# Patient Record
Sex: FEMALE | Race: WHITE | NOT HISPANIC OR LATINO
[De-identification: names, ages, dates, MRNs, and addresses within clinical notes are randomized per-mention and may not be internally consistent; named-entity substitution may affect disease eponyms.]

---

## 2017-01-31 ENCOUNTER — OTHER (OUTPATIENT)
Age: 52
End: 2017-01-31

## 2017-02-24 ENCOUNTER — RX RENEWAL (OUTPATIENT)
Age: 52
End: 2017-02-24

## 2017-04-10 ENCOUNTER — APPOINTMENT (OUTPATIENT)
Dept: OBGYN | Facility: CLINIC | Age: 52
End: 2017-04-10

## 2017-04-10 VITALS
SYSTOLIC BLOOD PRESSURE: 105 MMHG | HEIGHT: 62 IN | DIASTOLIC BLOOD PRESSURE: 69 MMHG | BODY MASS INDEX: 23.19 KG/M2 | WEIGHT: 126 LBS

## 2017-05-26 ENCOUNTER — RX RENEWAL (OUTPATIENT)
Age: 52
End: 2017-05-26

## 2017-09-16 ENCOUNTER — RX RENEWAL (OUTPATIENT)
Age: 52
End: 2017-09-16

## 2017-11-13 DIAGNOSIS — N63.0 UNSPECIFIED LUMP IN UNSPECIFIED BREAST: ICD-10-CM

## 2017-11-20 PROBLEM — N63.0 BREAST LUMP OR MASS: Status: ACTIVE | Noted: 2017-11-20

## 2017-11-27 ENCOUNTER — APPOINTMENT (OUTPATIENT)
Dept: OBGYN | Facility: CLINIC | Age: 52
End: 2017-11-27
Payer: COMMERCIAL

## 2017-11-27 VITALS
BODY MASS INDEX: 22.45 KG/M2 | HEIGHT: 62 IN | SYSTOLIC BLOOD PRESSURE: 99 MMHG | DIASTOLIC BLOOD PRESSURE: 68 MMHG | WEIGHT: 122 LBS

## 2017-11-27 PROCEDURE — 99396 PREV VISIT EST AGE 40-64: CPT

## 2017-11-28 ENCOUNTER — MESSAGE (OUTPATIENT)
Age: 52
End: 2017-11-28

## 2017-11-28 LAB — ESTRADIOL SERPL-MCNC: <5 PG/ML

## 2017-11-29 ENCOUNTER — MESSAGE (OUTPATIENT)
Age: 52
End: 2017-11-29

## 2017-11-29 LAB — HPV HIGH+LOW RISK DNA PNL CVX: NOT DETECTED

## 2017-11-30 ENCOUNTER — MESSAGE (OUTPATIENT)
Age: 52
End: 2017-11-30

## 2017-11-30 ENCOUNTER — MOBILE ON CALL (OUTPATIENT)
Age: 52
End: 2017-11-30

## 2017-11-30 LAB — CYTOLOGY CVX/VAG DOC THIN PREP: NORMAL

## 2018-01-18 ENCOUNTER — ASOB RESULT (OUTPATIENT)
Age: 53
End: 2018-01-18

## 2018-01-18 ENCOUNTER — APPOINTMENT (OUTPATIENT)
Dept: OBGYN | Facility: CLINIC | Age: 53
End: 2018-01-18
Payer: COMMERCIAL

## 2018-01-18 PROCEDURE — 76830 TRANSVAGINAL US NON-OB: CPT

## 2018-04-27 ENCOUNTER — RX RENEWAL (OUTPATIENT)
Age: 53
End: 2018-04-27

## 2018-05-21 ENCOUNTER — APPOINTMENT (OUTPATIENT)
Dept: OBGYN | Facility: CLINIC | Age: 53
End: 2018-05-21
Payer: COMMERCIAL

## 2018-05-21 VITALS
SYSTOLIC BLOOD PRESSURE: 111 MMHG | BODY MASS INDEX: 22.45 KG/M2 | HEIGHT: 62 IN | DIASTOLIC BLOOD PRESSURE: 77 MMHG | WEIGHT: 122 LBS

## 2018-05-21 PROCEDURE — 99214 OFFICE O/P EST MOD 30 MIN: CPT

## 2018-08-23 ENCOUNTER — RX RENEWAL (OUTPATIENT)
Age: 53
End: 2018-08-23

## 2018-12-10 ENCOUNTER — APPOINTMENT (OUTPATIENT)
Dept: OBGYN | Facility: CLINIC | Age: 53
End: 2018-12-10
Payer: COMMERCIAL

## 2018-12-10 VITALS
HEIGHT: 62 IN | DIASTOLIC BLOOD PRESSURE: 84 MMHG | WEIGHT: 124 LBS | SYSTOLIC BLOOD PRESSURE: 115 MMHG | BODY MASS INDEX: 22.82 KG/M2

## 2018-12-10 PROCEDURE — 82270 OCCULT BLOOD FECES: CPT

## 2018-12-10 PROCEDURE — 99396 PREV VISIT EST AGE 40-64: CPT

## 2018-12-11 ENCOUNTER — MESSAGE (OUTPATIENT)
Age: 53
End: 2018-12-11

## 2018-12-11 LAB — ESTRADIOL SERPL-MCNC: <5 PG/ML

## 2018-12-12 ENCOUNTER — MESSAGE (OUTPATIENT)
Age: 53
End: 2018-12-12

## 2018-12-12 LAB — HPV HIGH+LOW RISK DNA PNL CVX: NOT DETECTED

## 2018-12-14 ENCOUNTER — MESSAGE (OUTPATIENT)
Age: 53
End: 2018-12-14

## 2018-12-14 LAB — CYTOLOGY CVX/VAG DOC THIN PREP: NORMAL

## 2019-06-03 ENCOUNTER — APPOINTMENT (OUTPATIENT)
Dept: OBGYN | Facility: CLINIC | Age: 54
End: 2019-06-03
Payer: COMMERCIAL

## 2019-06-03 VITALS
BODY MASS INDEX: 22.82 KG/M2 | HEIGHT: 62 IN | WEIGHT: 124 LBS | SYSTOLIC BLOOD PRESSURE: 114 MMHG | DIASTOLIC BLOOD PRESSURE: 75 MMHG

## 2019-06-03 DIAGNOSIS — M81.0 AGE-RELATED OSTEOPOROSIS W/OUT CURRENT PATHOLOGICAL FRACTURE: ICD-10-CM

## 2019-06-03 PROCEDURE — 99214 OFFICE O/P EST MOD 30 MIN: CPT

## 2019-06-03 NOTE — CHIEF COMPLAINT
[FreeTextEntry1] : 54yo presents for evaluation and management of vaginal dryness and dyspareunia.\par She has been using vaginal estrogen with good response.

## 2019-07-08 ENCOUNTER — APPOINTMENT (OUTPATIENT)
Dept: OBGYN | Facility: CLINIC | Age: 54
End: 2019-07-08
Payer: COMMERCIAL

## 2019-07-08 ENCOUNTER — ASOB RESULT (OUTPATIENT)
Age: 54
End: 2019-07-08

## 2019-07-08 PROCEDURE — 76830 TRANSVAGINAL US NON-OB: CPT

## 2019-07-09 ENCOUNTER — MESSAGE (OUTPATIENT)
Age: 54
End: 2019-07-09

## 2019-08-07 ENCOUNTER — RX RENEWAL (OUTPATIENT)
Age: 54
End: 2019-08-07

## 2019-10-15 ENCOUNTER — RX RENEWAL (OUTPATIENT)
Age: 54
End: 2019-10-15

## 2019-10-17 ENCOUNTER — OTHER (OUTPATIENT)
Age: 54
End: 2019-10-17

## 2019-11-15 ENCOUNTER — MOBILE ON CALL (OUTPATIENT)
Age: 54
End: 2019-11-15

## 2019-12-11 ENCOUNTER — RX RENEWAL (OUTPATIENT)
Age: 54
End: 2019-12-11

## 2019-12-16 ENCOUNTER — APPOINTMENT (OUTPATIENT)
Dept: OBGYN | Facility: CLINIC | Age: 54
End: 2019-12-16

## 2020-02-24 ENCOUNTER — RX RENEWAL (OUTPATIENT)
Age: 55
End: 2020-02-24

## 2020-03-26 ENCOUNTER — RX RENEWAL (OUTPATIENT)
Age: 55
End: 2020-03-26

## 2020-07-14 ENCOUNTER — APPOINTMENT (OUTPATIENT)
Dept: OBGYN | Facility: CLINIC | Age: 55
End: 2020-07-14
Payer: COMMERCIAL

## 2020-07-14 VITALS
SYSTOLIC BLOOD PRESSURE: 125 MMHG | BODY MASS INDEX: 23.19 KG/M2 | DIASTOLIC BLOOD PRESSURE: 79 MMHG | HEIGHT: 62 IN | WEIGHT: 126 LBS

## 2020-07-14 PROCEDURE — 82270 OCCULT BLOOD FECES: CPT

## 2020-07-14 PROCEDURE — 99396 PREV VISIT EST AGE 40-64: CPT

## 2020-07-14 NOTE — PHYSICAL EXAM
[Awake] : awake [Alert] : alert [Acute Distress] : no acute distress [Thyroid Nodule] : no thyroid nodule [Mass] : no breast mass [Goiter] : no goiter [Axillary LAD] : no axillary lymphadenopathy [Nipple Discharge] : no nipple discharge [Soft] : soft [Tender] : non tender [Oriented x3] : oriented to person, place, and time [Normal] : cervix [Atrophy] : atrophy [Dry Mucosa] : dry mucosa [No Bleeding] : there was no active vaginal bleeding [Uterine Adnexae] : were not tender and not enlarged [No Tenderness] : no rectal tenderness [Nl Sphincter Tone] : normal sphincter tone [Occult Blood] : occult blood test from digital rectal exam was negative [External Hemorrhoid] : an external hemorrhoid

## 2020-07-15 ENCOUNTER — MESSAGE (OUTPATIENT)
Age: 55
End: 2020-07-15

## 2020-07-15 LAB — ESTRADIOL SERPL-MCNC: <5 PG/ML

## 2020-07-17 ENCOUNTER — MESSAGE (OUTPATIENT)
Age: 55
End: 2020-07-17

## 2020-07-17 LAB — HPV HIGH+LOW RISK DNA PNL CVX: NOT DETECTED

## 2020-07-18 LAB — CYTOLOGY CVX/VAG DOC THIN PREP: ABNORMAL

## 2020-07-21 ENCOUNTER — APPOINTMENT (OUTPATIENT)
Dept: OBGYN | Facility: CLINIC | Age: 55
End: 2020-07-21
Payer: COMMERCIAL

## 2020-07-21 ENCOUNTER — ASOB RESULT (OUTPATIENT)
Age: 55
End: 2020-07-21

## 2020-07-21 PROCEDURE — 76830 TRANSVAGINAL US NON-OB: CPT

## 2020-10-30 ENCOUNTER — RX RENEWAL (OUTPATIENT)
Age: 55
End: 2020-10-30

## 2020-12-14 ENCOUNTER — NON-APPOINTMENT (OUTPATIENT)
Age: 55
End: 2020-12-14

## 2020-12-15 ENCOUNTER — NON-APPOINTMENT (OUTPATIENT)
Age: 55
End: 2020-12-15

## 2020-12-29 ENCOUNTER — NON-APPOINTMENT (OUTPATIENT)
Age: 55
End: 2020-12-29

## 2021-01-04 ENCOUNTER — APPOINTMENT (OUTPATIENT)
Dept: OBGYN | Facility: CLINIC | Age: 56
End: 2021-01-04
Payer: COMMERCIAL

## 2021-01-04 VITALS
SYSTOLIC BLOOD PRESSURE: 107 MMHG | HEIGHT: 62 IN | BODY MASS INDEX: 23.19 KG/M2 | WEIGHT: 126 LBS | DIASTOLIC BLOOD PRESSURE: 75 MMHG

## 2021-01-04 PROCEDURE — 99214 OFFICE O/P EST MOD 30 MIN: CPT

## 2021-01-04 PROCEDURE — 99072 ADDL SUPL MATRL&STAF TM PHE: CPT

## 2021-01-04 NOTE — HISTORY OF PRESENT ILLNESS
[FreeTextEntry1] : 56yo,  presents for evaliuation and management of genitourinary syndrome of menopause with dyspareunia\par Her gyn history is significant for:\par 1.Vulvovaginal atrophy\par 2.Endometriosis\par 3.Benign breast biopsy 2020\par LMP 2010

## 2021-01-04 NOTE — DISCUSSION/SUMMARY
[FreeTextEntry1] : 54yo with good redsponse to vaginal estrogen\par Plan:\par 1.Continue vagifem\par 2.Left mammo/sono follow up 6/2021

## 2021-04-09 ENCOUNTER — RX RENEWAL (OUTPATIENT)
Age: 56
End: 2021-04-09

## 2021-06-30 ENCOUNTER — APPOINTMENT (OUTPATIENT)
Dept: OBGYN | Facility: CLINIC | Age: 56
End: 2021-06-30
Payer: COMMERCIAL

## 2021-06-30 VITALS — HEIGHT: 62 IN | DIASTOLIC BLOOD PRESSURE: 75 MMHG | SYSTOLIC BLOOD PRESSURE: 105 MMHG

## 2021-06-30 DIAGNOSIS — Z12.39 ENCOUNTER FOR OTHER SCREENING FOR MALIGNANT NEOPLASM OF BREAST: ICD-10-CM

## 2021-06-30 PROCEDURE — 82270 OCCULT BLOOD FECES: CPT

## 2021-06-30 PROCEDURE — 99072 ADDL SUPL MATRL&STAF TM PHE: CPT

## 2021-06-30 PROCEDURE — 99396 PREV VISIT EST AGE 40-64: CPT

## 2021-06-30 NOTE — HISTORY OF PRESENT ILLNESS
[FreeTextEntry1] : 54yo,  with gyn history significant for:\par 1.Left benign brest biopsy\par 2.Vulvovaginal atrophy\par 3.Endometriosis\par LMP 2010

## 2021-06-30 NOTE — DISCUSSION/SUMMARY
[FreeTextEntry1] : 56yo with good control of genitourinary syndrome of menopause symptom complex\par Plan:\par 1.TVS\par 2.F/U left mammo/sono\par 3.RF vagifem 6 months\par 4.PAP\par 5.Colonsocopy per routine

## 2021-06-30 NOTE — PHYSICAL EXAM
[Examination Of The Breasts] : a normal appearance [No Masses] : no breast masses were palpable [Labia Majora] : normal [Labia Minora] : normal [Atrophy] : atrophy [Dry Mucosa] : dry mucosa [Normal] : normal [Uterine Adnexae] : normal [No Tenderness] : no tenderness [Nl Sphincter Tone] : normal sphincter tone [External Hemorrhoid] : external hemorrhoid [FreeTextEntry9] : stool HO neg

## 2021-07-01 ENCOUNTER — MESSAGE (OUTPATIENT)
Age: 56
End: 2021-07-01

## 2021-07-01 LAB — ESTRADIOL SERPL-MCNC: <5 PG/ML

## 2021-07-02 ENCOUNTER — MESSAGE (OUTPATIENT)
Age: 56
End: 2021-07-02

## 2021-07-02 LAB — HPV HIGH+LOW RISK DNA PNL CVX: NOT DETECTED

## 2021-07-06 ENCOUNTER — NON-APPOINTMENT (OUTPATIENT)
Age: 56
End: 2021-07-06

## 2021-07-07 ENCOUNTER — MESSAGE (OUTPATIENT)
Age: 56
End: 2021-07-07

## 2021-07-07 LAB — CYTOLOGY CVX/VAG DOC THIN PREP: NORMAL

## 2021-07-27 ENCOUNTER — NON-APPOINTMENT (OUTPATIENT)
Age: 56
End: 2021-07-27

## 2021-08-10 ENCOUNTER — ASOB RESULT (OUTPATIENT)
Age: 56
End: 2021-08-10

## 2021-08-10 ENCOUNTER — APPOINTMENT (OUTPATIENT)
Dept: OBGYN | Facility: CLINIC | Age: 56
End: 2021-08-10
Payer: COMMERCIAL

## 2021-08-10 PROCEDURE — 76830 TRANSVAGINAL US NON-OB: CPT

## 2021-08-11 ENCOUNTER — NON-APPOINTMENT (OUTPATIENT)
Age: 56
End: 2021-08-11

## 2021-12-13 PROBLEM — Z12.39 BREAST CANCER SCREENING OTHER THAN MAMMOGRAM: Status: ACTIVE | Noted: 2021-12-13

## 2022-01-04 ENCOUNTER — NON-APPOINTMENT (OUTPATIENT)
Age: 57
End: 2022-01-04

## 2022-01-04 ENCOUNTER — APPOINTMENT (OUTPATIENT)
Dept: OBGYN | Facility: CLINIC | Age: 57
End: 2022-01-04
Payer: COMMERCIAL

## 2022-01-04 ENCOUNTER — RX RENEWAL (OUTPATIENT)
Age: 57
End: 2022-01-04

## 2022-01-04 VITALS
BODY MASS INDEX: 23.19 KG/M2 | DIASTOLIC BLOOD PRESSURE: 74 MMHG | SYSTOLIC BLOOD PRESSURE: 110 MMHG | WEIGHT: 126 LBS | HEIGHT: 62 IN

## 2022-01-04 DIAGNOSIS — N94.10 UNSPECIFIED DYSPAREUNIA: ICD-10-CM

## 2022-01-04 DIAGNOSIS — N95.2 POSTMENOPAUSAL ATROPHIC VAGINITIS: ICD-10-CM

## 2022-01-04 PROCEDURE — 99214 OFFICE O/P EST MOD 30 MIN: CPT

## 2022-01-04 NOTE — HISTORY OF PRESENT ILLNESS
[FreeTextEntry1] : 55yo,   for evaluation and management of vulvovaginal atrophy and dyspareunia\par Her symptoms have responded well to vaginal estrogen.Her gyn history is significant for:\par 1.Benign breast biopsy\par 2.Endometriosis\par LMP 2010

## 2022-01-04 NOTE — DISCUSSION/SUMMARY
[FreeTextEntry1] : 57yo with  good clinical results on Vagifem, risks reviewed\par \par Plan:\par 1.Continue

## 2022-01-04 NOTE — PHYSICAL EXAM
[Chaperone Declined] : Patient declined chaperone [Examination Of The Breasts] : a normal appearance [No Masses] : no breast masses were palpable [Labia Majora] : normal [Labia Minora] : normal [Normal] : normal [Uterine Adnexae] : normal

## 2022-07-05 ENCOUNTER — APPOINTMENT (OUTPATIENT)
Dept: OBGYN | Facility: CLINIC | Age: 57
End: 2022-07-05

## 2022-07-21 ENCOUNTER — APPOINTMENT (OUTPATIENT)
Dept: OBGYN | Facility: CLINIC | Age: 57
End: 2022-07-21

## 2022-07-21 VITALS
SYSTOLIC BLOOD PRESSURE: 100 MMHG | HEART RATE: 90 BPM | WEIGHT: 127 LBS | DIASTOLIC BLOOD PRESSURE: 66 MMHG | BODY MASS INDEX: 23.37 KG/M2 | HEIGHT: 62 IN

## 2022-07-21 DIAGNOSIS — Z87.59 PERSONAL HISTORY OF OTHER COMPLICATIONS OF PREGNANCY, CHILDBIRTH AND THE PUERPERIUM: ICD-10-CM

## 2022-07-21 DIAGNOSIS — Z87.42 PERSONAL HISTORY OF OTHER DISEASES OF THE FEMALE GENITAL TRACT: ICD-10-CM

## 2022-07-21 DIAGNOSIS — Z12.11 ENCOUNTER FOR SCREENING FOR MALIGNANT NEOPLASM OF COLON: ICD-10-CM

## 2022-07-21 DIAGNOSIS — Z78.9 OTHER SPECIFIED HEALTH STATUS: ICD-10-CM

## 2022-07-21 DIAGNOSIS — Z12.12 ENCOUNTER FOR SCREENING FOR MALIGNANT NEOPLASM OF COLON: ICD-10-CM

## 2022-07-21 PROCEDURE — 99396 PREV VISIT EST AGE 40-64: CPT

## 2022-07-21 RX ORDER — ESTRADIOL 10 UG/1
10 TABLET VAGINAL
Qty: 24 | Refills: 2 | Status: COMPLETED | COMMUNITY
Start: 2021-01-04 | End: 2022-07-21

## 2022-07-21 RX ORDER — ESTRADIOL 10 UG/1
10 TABLET, FILM COATED VAGINAL
Qty: 24 | Refills: 1 | Status: COMPLETED | COMMUNITY
Start: 2018-12-10 | End: 2022-07-21

## 2022-07-21 RX ORDER — ESTRADIOL 10 UG/1
10 TABLET VAGINAL
Qty: 24 | Refills: 0 | Status: COMPLETED | COMMUNITY
Start: 2017-02-24 | End: 2022-07-21

## 2022-07-21 RX ORDER — ESTRADIOL 10 UG/1
10 TABLET VAGINAL
Qty: 8 | Refills: 0 | Status: COMPLETED | COMMUNITY
Start: 2017-11-27 | End: 2022-07-21

## 2022-07-21 RX ORDER — ESTRADIOL 10 UG/1
10 INSERT VAGINAL
Qty: 24 | Refills: 0 | Status: COMPLETED | COMMUNITY
Start: 2017-04-10 | End: 2022-07-21

## 2022-07-21 RX ORDER — ESTRADIOL 10 UG/1
10 TABLET, FILM COATED VAGINAL
Qty: 24 | Refills: 2 | Status: COMPLETED | COMMUNITY
Start: 2021-06-30 | End: 2022-07-21

## 2022-07-21 RX ORDER — ESTRADIOL 10 UG/1
10 TABLET VAGINAL
Qty: 24 | Refills: 0 | Status: COMPLETED | COMMUNITY
Start: 2019-11-15 | End: 2022-07-21

## 2022-07-21 RX ORDER — MELOXICAM 15 MG/1
15 TABLET ORAL
Qty: 30 | Refills: 0 | Status: ACTIVE | COMMUNITY
Start: 2022-02-25

## 2022-07-21 RX ORDER — CYCLOSPORINE 0.5 MG/ML
0.05 EMULSION OPHTHALMIC
Qty: 60 | Refills: 0 | Status: COMPLETED | COMMUNITY
Start: 2022-03-14

## 2022-07-21 RX ORDER — ESTRADIOL 10 UG/1
10 TABLET, FILM COATED VAGINAL
Qty: 8 | Refills: 0 | Status: COMPLETED | COMMUNITY
Start: 2022-01-04 | End: 2022-07-21

## 2022-07-21 RX ORDER — ESTRADIOL 10 UG/1
10 TABLET, FILM COATED VAGINAL
Qty: 3 | Refills: 0 | Status: COMPLETED | COMMUNITY
Start: 2020-07-14 | End: 2022-07-21

## 2022-07-21 RX ORDER — ESTRADIOL 10 UG/1
10 TABLET, FILM COATED VAGINAL
Qty: 24 | Refills: 0 | Status: COMPLETED | COMMUNITY
Start: 2018-05-21 | End: 2022-07-21

## 2022-07-21 RX ORDER — LIFITEGRAST 50 MG/ML
5 SOLUTION/ DROPS OPHTHALMIC
Qty: 60 | Refills: 0 | Status: ACTIVE | COMMUNITY
Start: 2022-07-06

## 2022-07-21 NOTE — HISTORY OF PRESENT ILLNESS
[TextBox_4] : 55yo presents for annual exam \par pt states she was being seen twice a year for vagifem - was checking estrogen levels \par no complaints  [Mammogramdate] : 2021 [PapSmeardate] : 2021 [BoneDensityDate] : 2019 [ColonoscopyDate] : 5 years ago

## 2022-07-28 LAB
CYTOLOGY CVX/VAG DOC THIN PREP: ABNORMAL
HPV HIGH+LOW RISK DNA PNL CVX: DETECTED

## 2022-08-03 ENCOUNTER — NON-APPOINTMENT (OUTPATIENT)
Age: 57
End: 2022-08-03

## 2022-08-04 ENCOUNTER — APPOINTMENT (OUTPATIENT)
Dept: OBGYN | Facility: CLINIC | Age: 57
End: 2022-08-04

## 2022-08-04 ENCOUNTER — TRANSCRIPTION ENCOUNTER (OUTPATIENT)
Age: 57
End: 2022-08-04

## 2022-08-04 DIAGNOSIS — R87.610 ATYPICAL SQUAMOUS CELLS OF UNDETERMINED SIGNIFICANCE ON CYTOLOGIC SMEAR OF CERVIX (ASC-US): ICD-10-CM

## 2022-08-04 DIAGNOSIS — R87.810 ATYPICAL SQUAMOUS CELLS OF UNDETERMINED SIGNIFICANCE ON CYTOLOGIC SMEAR OF CERVIX (ASC-US): ICD-10-CM

## 2022-08-04 PROCEDURE — 57454 BX/CURETT OF CERVIX W/SCOPE: CPT

## 2022-08-04 NOTE — PROCEDURE
[Colposcopy] : Colposcopy  [Risks] : risks [Benefits] : benefits [Alternatives] : alternatives [Abnormal Pap] : abnormal pap [HPV High Risk] : HPV high risk [Colposcopy Adequate] : colposcopy adequate [SCI Fully Visualized] : SCI fully visualized [ECC Performed] : ECC performed [Hemostasis Obtained] : Hemostasis obtained [Tolerated Well] : the patient tolerated the procedure well [de-identified] : a [de-identified] : acetowhite 4, 12 [de-identified] : 4, 12

## 2022-08-11 ENCOUNTER — NON-APPOINTMENT (OUTPATIENT)
Age: 57
End: 2022-08-11

## 2022-08-11 LAB — CORE LAB BIOPSY: NORMAL

## 2022-08-15 ENCOUNTER — NON-APPOINTMENT (OUTPATIENT)
Age: 57
End: 2022-08-15

## 2022-08-17 ENCOUNTER — NON-APPOINTMENT (OUTPATIENT)
Age: 57
End: 2022-08-17

## 2022-08-25 ENCOUNTER — NON-APPOINTMENT (OUTPATIENT)
Age: 57
End: 2022-08-25

## 2022-08-30 ENCOUNTER — NON-APPOINTMENT (OUTPATIENT)
Age: 57
End: 2022-08-30

## 2022-09-06 ENCOUNTER — NON-APPOINTMENT (OUTPATIENT)
Age: 57
End: 2022-09-06

## 2022-09-08 ENCOUNTER — NON-APPOINTMENT (OUTPATIENT)
Age: 57
End: 2022-09-08

## 2022-11-22 ENCOUNTER — EMERGENCY (EMERGENCY)
Facility: HOSPITAL | Age: 57
LOS: 1 days | Discharge: HOME | End: 2022-11-22
Attending: EMERGENCY MEDICINE | Admitting: EMERGENCY MEDICINE
Payer: COMMERCIAL

## 2022-11-22 VITALS
TEMPERATURE: 97 F | HEART RATE: 98 BPM | DIASTOLIC BLOOD PRESSURE: 86 MMHG | OXYGEN SATURATION: 99 % | HEIGHT: 62 IN | SYSTOLIC BLOOD PRESSURE: 118 MMHG | RESPIRATION RATE: 18 BRPM | WEIGHT: 130.07 LBS

## 2022-11-22 VITALS
RESPIRATION RATE: 18 BRPM | DIASTOLIC BLOOD PRESSURE: 79 MMHG | HEART RATE: 90 BPM | SYSTOLIC BLOOD PRESSURE: 115 MMHG | OXYGEN SATURATION: 99 %

## 2022-11-22 PROCEDURE — 99284 EMERGENCY DEPT VISIT MOD MDM: CPT

## 2022-11-22 PROCEDURE — 70450 CT HEAD/BRAIN W/O DYE: CPT | Mod: MA

## 2022-11-22 PROCEDURE — 70450 CT HEAD/BRAIN W/O DYE: CPT | Mod: 26,MA

## 2022-11-22 PROCEDURE — 99284 EMERGENCY DEPT VISIT MOD MDM: CPT | Mod: 25

## 2022-11-22 RX ORDER — ACETAMINOPHEN 500 MG
975 TABLET ORAL ONCE
Refills: 0 | Status: COMPLETED | OUTPATIENT
Start: 2022-11-22 | End: 2022-11-22

## 2022-11-22 RX ADMIN — Medication 975 MILLIGRAM(S): at 12:35

## 2022-11-22 RX ADMIN — Medication 975 MILLIGRAM(S): at 12:05

## 2022-11-22 NOTE — ED PROVIDER NOTE - NSFOLLOWUPINSTRUCTIONS_ED_ALL_ED_FT
(0) understands/communicates without difficulty
Shingles    A rash on the skin.   Shingles, which is also known as herpes zoster, is an infection that causes a painful skin rash and fluid-filled blisters. It is caused by a virus.    Shingles only develops in people who:  •Have had chickenpox.      •Have been vaccinated against chickenpox. Shingles is rare in this group.        What are the causes?    Shingles is caused by varicella-zoster virus. This is the same virus that causes chickenpox. After a person is exposed to the virus, it stays in the body in an inactive (dormant) state. Shingles develops if the virus is reactivated. This can happen many years after the first (initial) exposure to the virus. It is not known what causes this virus to be reactivated.      What increases the risk?    People who have had chickenpox or received the chickenpox vaccine are at risk for shingles. Shingles infection is more common in people who:  •Are older than 60 years of age.    •Have a weakened disease-fighting system (immune system), such as people with:  •HIV (human immunodeficiency virus).      •AIDS (acquired immunodeficiency syndrome).      •Cancer.        •Are taking medicines that weaken the immune system, such as organ transplant medicines.      •Are experiencing a lot of stress.        What are the signs or symptoms?    Early symptoms of this condition include itching, tingling, and pain in an area on your skin. Pain may be described as burning, stabbing, or throbbing.    A few days or weeks after early symptoms start, a painful red rash appears. The rash is usually on one side of the body and has a band-like or belt-like pattern. The rash eventually turns into fluid-filled blisters that break open, change into scabs, and dry up in about 2–3 weeks.    At any time during the infection, you may also develop:  •A fever.      •Chills.      •A headache.      •Nausea.        How is this diagnosed?    This condition is diagnosed with a skin exam. Skin or fluid samples (a culture) may be taken from the blisters before a diagnosis is made.      How is this treated?    The rash may last for several weeks. There is not a specific cure for this condition. Your health care provider may prescribe medicines to help you manage pain, recover more quickly, and avoid long-term problems. Medicines may include:  •Antiviral medicines.      •Anti-inflammatory medicines.      •Pain medicines.      •Anti-itching medicines (antihistamines).      If the area involved is on your face, you may be referred to a specialist, such as an eye doctor (ophthalmologist) or an ear, nose, and throat (ENT) doctor (otorhinolaryngologist) to help you avoid eye problems, chronic pain, or disability.      Follow these instructions at home:    Medicines     •Take over-the-counter and prescription medicines only as told by your health care provider.      •Apply an anti-itch cream or numbing cream to the affected area as told by your health care provider.        Relieving itching and discomfort   A bathtub filled with water.   •Apply cold, wet cloths (cold compresses) to the area of the rash or blisters as told by your health care provider.      •Cool baths can be soothing. Try adding baking soda or dry oatmeal to the water to reduce itching. Do not bathe in hot water.      •Use calamine lotion as recommended by your health care provider. This is an over-the-counter lotion that helps to relieve itchiness.      Blister and rash care     •Keep your rash covered with a loose bandage (dressing). Wear loose-fitting clothing to help ease the pain of material rubbing against the rash.      •Wash your hands with soap and water for at least 20 seconds before and after you change your dressing. If soap and water are not available, use hand .      •Change your dressing as told by your health care provider.      •Keep your rash and blisters clean by washing the area with mild soap and cool water as told by your health care provider.    •Check your rash every day for signs of infection. Check for:  •More redness, swelling, or pain.      •Fluid or blood.      •Warmth.      •Pus or a bad smell.      • Do not scratch your rash or pick at your blisters. To help avoid scratching:  •Keep your fingernails clean and cut short.      •Wear gloves or mittens while you sleep, if scratching is a problem.        General instructions     •Rest as told by your health care provider.      •Wash your hands often with soap and water for at least 20 seconds. If soap and water are not available, use hand . Doing this lowers your chance of getting a bacterial skin infection.    •Before your blisters change into scabs, your shingles infection can cause chickenpox in people who have never had it or have never been vaccinated against it. To prevent this from happening, avoid contact with other people, especially:  •Babies.      •Pregnant women.      •Children who have eczema.      •Older people who have transplants.      •People who have chronic illnesses, such as cancer or AIDS.        •Keep all follow-up visits. This is important.        How is this prevented?    Getting vaccinated is the best way to prevent shingles and protect against shingles complications. If you have not been vaccinated, talk with your health care provider about getting the vaccine.      Where to find more information    •Centers for Disease Control and Prevention: www.cdc.gov        Contact a health care provider if:    •Your pain is not relieved with prescribed medicines.      •Your pain does not get better after the rash heals.    •You have any of these signs of infection:  •More redness, swelling, or pain around the rash.      •Fluid or blood coming from the rash.      •Warmth coming from your rash.      •Pus or a bad smell coming from the rash.      •A fever.          Get help right away if:    •The rash is on your face or nose.      •You have facial pain, pain around your eye area, or loss of feeling on one side of your face.      •You have difficulty seeing.      •You have ear pain or have ringing in your ear.      •You have a loss of taste.      •Your condition gets worse.        Summary    •Shingles, also known as herpes zoster, is an infection that causes a painful skin rash and fluid-filled blisters.      •This condition is diagnosed with a skin exam. Skin or fluid samples (a culture) may be taken from the blisters.      •Keep your rash covered with a loose bandage (dressing). Wear loose-fitting clothing to help ease the pain of material rubbing against the rash.      •Before your blisters change into scabs, your shingles infection can cause chickenpox in people who have never had it or have never been vaccinated against it.      This information is not intended to replace advice given to you by your health care provider. Make sure you discuss any questions you have with your health care provider.    **Follow up with your doctor. Take ibuprofen for pain. Follow up with neurologist if pain persists beyond 2 weeks. Return for worsening symptoms, any concerns.

## 2022-11-22 NOTE — ED PROVIDER NOTE - PATIENT PORTAL LINK FT
You can access the FollowMyHealth Patient Portal offered by [facility] by registering at the following website: [email or web address]. By joining "[employer]"’s FollowMyHealth portal, you will also be able to view your health information using other applications (apps) compatible with our system.

## 2022-11-22 NOTE — ED PROVIDER NOTE - SKIN NEGATIVE STATEMENT, MLM
no abrasions, no jaundice, no lesions, no pruritis, and no rashes. left scalp pain radiating down to left ear/jaw

## 2022-11-22 NOTE — ED PROVIDER NOTE - OBJECTIVE STATEMENT
Patient came into the ED with family c/o left scalp pain radiating down to left ear/jaw x 1 week. pain is relieved with advil, but returns. patient has had a headache since last night. no N/V. no dizziness. no rash. never occurred previously. no numbing/tingling. banged her head lightly 5 days before symptoms started. patient took advil this morning with some relief.

## 2022-11-22 NOTE — ED ADULT NURSE NOTE - OBJECTIVE STATEMENT
patient comes to ED for evaluation of scalp pain pain left ear and into jaw which she developed approx one week ago five days after banging her head patient report  pain worsens when she touches her scalp or moves her hair patient is taking Advil with partial relief patient reports she thought she may have shingles but hasn't developed any rash

## 2022-11-22 NOTE — ED PROVIDER NOTE - PROGRESS NOTE DETAILS
CT reviewed with patient. to f/u PCP. if pain persists beyond 2 weeks to f/u neurologist. discussed again probable shingles.

## 2022-11-22 NOTE — ED ADULT NURSE REASSESSMENT NOTE - NS ED NURSE REASSESS COMMENT FT1
1130: Received pt from previous nurse, Segundo MIRZA in room 6A, a/o x4 , no respiratory distress noted even and unlabored breathing, skin warm and dry, all pulses are palpable, + sensation. Abd soft BS + all 4 quads nontender. Pt c/o scalp pain, denies injury.

## 2022-11-22 NOTE — ED ADULT TRIAGE NOTE - CHIEF COMPLAINT QUOTE
Pt ambulatory to triage c/o pain to the L side of her scalp now radiating down her ear/neck x 1 week.    Pt denies dizziness/weakness/nausea/changes in balance.  Initially denies headache though states "I have a headache today because its been bothering me all night".   No acute neurological deficits.    Denies any rash/swelling fevers.   Pt did hit her head 2 weeks ago though pain was minimal and improved after several days, pt does not take blood thinners. BN

## 2023-02-10 ENCOUNTER — APPOINTMENT (OUTPATIENT)
Dept: INTERNAL MEDICINE | Facility: CLINIC | Age: 58
End: 2023-02-10

## 2023-04-25 PROBLEM — E03.9 HYPOTHYROIDISM, UNSPECIFIED: Chronic | Status: ACTIVE | Noted: 2022-11-22

## 2023-07-24 ENCOUNTER — APPOINTMENT (OUTPATIENT)
Dept: OBGYN | Facility: CLINIC | Age: 58
End: 2023-07-24

## 2023-08-01 ENCOUNTER — LABORATORY RESULT (OUTPATIENT)
Age: 58
End: 2023-08-01

## 2023-08-01 ENCOUNTER — APPOINTMENT (OUTPATIENT)
Dept: OBGYN | Facility: CLINIC | Age: 58
End: 2023-08-01
Payer: COMMERCIAL

## 2023-08-01 VITALS
DIASTOLIC BLOOD PRESSURE: 75 MMHG | SYSTOLIC BLOOD PRESSURE: 115 MMHG | WEIGHT: 132 LBS | HEART RATE: 90 BPM | BODY MASS INDEX: 24.29 KG/M2 | HEIGHT: 62 IN

## 2023-08-01 DIAGNOSIS — Z01.419 ENCOUNTER FOR GYNECOLOGICAL EXAMINATION (GENERAL) (ROUTINE) W/OUT ABNORMAL FINDINGS: ICD-10-CM

## 2023-08-01 PROCEDURE — 99396 PREV VISIT EST AGE 40-64: CPT

## 2023-08-01 NOTE — DISCUSSION/SUMMARY
[FreeTextEntry1] : thin prep and HPV ordered referral given for tvus (pt request bc on vagifem), mammogram and breast sonogram rto in 1 year or soon for any gyn issues. pt verbalized understanding.

## 2023-08-01 NOTE — HISTORY OF PRESENT ILLNESS
[Patient reported mammogram was normal] : Patient reported mammogram was normal [Patient reported breast sonogram was normal] : Patient reported breast sonogram was normal [Patient reported PAP Smear was normal] : Patient reported PAP Smear was normal [Patient reported bone density results were normal] : Patient reported bone density results were normal [Post-Menopause, No Sxs] : post-menopausal, currently without symptoms [Patient refuses STI testing] : Patient refuses STI testing [FreeTextEntry1] : 56 y/o  female, LMP: 10 yrs ago for annual GYN visit.  Patient denies any GYN complaints.   Sexual Activity: monogamous with  Social/Mental Health: reports social ETOH, denies tobacco or any illicit drug use.  Denies depression, anxiety, thoughts of personal harm or suicidal ideation.  ROS:  Denies fever/chills, HA, Cough/sore throat, CP, SOB, N/V, Diarrhea/Constipation, Pelvic pain, Urinary frequency/urgency/incontinence, irregular vaginal bleeding, discharge or irritation.    Medical History GYN HX: abnl pap, HPV. PMH: hypothyroidism PSH: D&C, laparoscopy dx endometriosis Meds: levothyroxine, vagifem Allergies: NKDA fam hx: no updates [Mammogramdate] : 3/2023 [BreastSonogramDate] : 3/2023 [PapSmeardate] : 7/2021 [BoneDensityDate] : 2019 [ColonoscopyDate] : utd

## 2023-08-04 ENCOUNTER — NON-APPOINTMENT (OUTPATIENT)
Age: 58
End: 2023-08-04

## 2023-08-04 LAB
CYTOLOGY CVX/VAG DOC THIN PREP: NORMAL
HPV HIGH+LOW RISK DNA PNL CVX: DETECTED

## 2023-08-08 ENCOUNTER — ASOB RESULT (OUTPATIENT)
Age: 58
End: 2023-08-08

## 2023-08-08 ENCOUNTER — APPOINTMENT (OUTPATIENT)
Dept: OBGYN | Facility: CLINIC | Age: 58
End: 2023-08-08
Payer: COMMERCIAL

## 2023-08-08 ENCOUNTER — TRANSCRIPTION ENCOUNTER (OUTPATIENT)
Age: 58
End: 2023-08-08

## 2023-08-08 PROCEDURE — 76830 TRANSVAGINAL US NON-OB: CPT

## 2023-09-07 ENCOUNTER — TRANSCRIPTION ENCOUNTER (OUTPATIENT)
Age: 58
End: 2023-09-07

## 2023-09-08 ENCOUNTER — TRANSCRIPTION ENCOUNTER (OUTPATIENT)
Age: 58
End: 2023-09-08

## 2023-09-12 ENCOUNTER — TRANSCRIPTION ENCOUNTER (OUTPATIENT)
Age: 58
End: 2023-09-12

## 2024-08-13 ENCOUNTER — APPOINTMENT (OUTPATIENT)
Dept: OBGYN | Facility: CLINIC | Age: 59
End: 2024-08-13
Payer: COMMERCIAL

## 2024-08-13 DIAGNOSIS — Z01.419 ENCOUNTER FOR GYNECOLOGICAL EXAMINATION (GENERAL) (ROUTINE) W/OUT ABNORMAL FINDINGS: ICD-10-CM

## 2024-08-13 DIAGNOSIS — N63.0 UNSPECIFIED LUMP IN UNSPECIFIED BREAST: ICD-10-CM

## 2024-08-13 DIAGNOSIS — N93.0 POSTCOITAL AND CONTACT BLEEDING: ICD-10-CM

## 2024-08-13 PROCEDURE — 99459 PELVIC EXAMINATION: CPT

## 2024-08-13 PROCEDURE — 99396 PREV VISIT EST AGE 40-64: CPT

## 2024-08-13 NOTE — PHYSICAL EXAM
[Chaperone Present] : A chaperone was present in the examining room during all aspects of the physical examination [[id number]] : A chaperone was present during the pelvic exam. [FreeTextEntry2] : Marilia RAMIRES [Appropriately responsive] : appropriately responsive [Alert] : alert [No Acute Distress] : no acute distress [Soft] : soft [Non-tender] : non-tender [Non-distended] : non-distended [No Lesions] : no lesions [No Mass] : no mass [Oriented x3] : oriented x3 [Examination Of The Breasts] : a normal appearance [No Masses] : no breast masses were palpable [Labia Majora] : normal [Labia Minora] : normal [Normal] : normal [Uterine Adnexae] : normal

## 2024-08-13 NOTE — DISCUSSION/SUMMARY
[FreeTextEntry1] : thin prep and HPV ordered referral given for tvus (post-coital spotting), mammogram and breast sonogram rto in 1 year or soon for any gyn issues. pt verbalized understanding.

## 2024-08-13 NOTE — HISTORY OF PRESENT ILLNESS
[Patient reported mammogram was normal] : Patient reported mammogram was normal [Patient reported breast sonogram was normal] : Patient reported breast sonogram was normal [Patient reported PAP Smear was normal] : Patient reported PAP Smear was normal [Patient reported bone density results were normal] : Patient reported bone density results were normal [FreeTextEntry1] : 59 y/o  female, LMP: 10 yrs ago for annual GYN visit. Patient denies any GYN complaints.  Sexual Activity: monogamous with  Social/Mental Health: reports social ETOH, denies tobacco or any illicit drug use. Denies depression, anxiety, thoughts of personal harm or suicidal ideation.  ROS: Denies fever/chills, HA, Cough/sore throat, CP, SOB, N/V, Diarrhea/Constipation, Pelvic pain, Urinary frequency/urgency/incontinence, irregular vaginal bleeding, discharge or irritation.  Medical History GYN HX: abnl pap, HPV. PMH: hypothyroidism PSH: D&C, laparoscopy dx endometriosis Meds: levothyroxine, vagifem Allergies: NKDA fam hx: no updates [Mammogramdate] : 3/2024 [BreastSonogramDate] : 3/2024 [PapSmeardate] : 8/23 [BoneDensityDate] : 8/2023 [TextBox_43] : f/u w PMD for referral [Post-Menopause, No Sxs] : post-menopausal, currently without symptoms [Patient refuses STI testing] : Patient refuses STI testing

## 2024-08-14 LAB — HPV HIGH+LOW RISK DNA PNL CVX: NOT DETECTED

## 2024-08-19 LAB — CYTOLOGY CVX/VAG DOC THIN PREP: NORMAL

## 2024-08-22 ENCOUNTER — ASOB RESULT (OUTPATIENT)
Age: 59
End: 2024-08-22

## 2024-08-22 ENCOUNTER — APPOINTMENT (OUTPATIENT)
Dept: OBGYN | Facility: CLINIC | Age: 59
End: 2024-08-22
Payer: COMMERCIAL

## 2024-08-22 PROCEDURE — 76830 TRANSVAGINAL US NON-OB: CPT

## 2025-08-13 ENCOUNTER — NON-APPOINTMENT (OUTPATIENT)
Age: 60
End: 2025-08-13

## 2025-08-15 ENCOUNTER — APPOINTMENT (OUTPATIENT)
Dept: OBGYN | Facility: CLINIC | Age: 60
End: 2025-08-15

## 2025-08-15 VITALS
BODY MASS INDEX: 22.08 KG/M2 | DIASTOLIC BLOOD PRESSURE: 76 MMHG | HEIGHT: 62 IN | HEART RATE: 64 BPM | WEIGHT: 120 LBS | SYSTOLIC BLOOD PRESSURE: 108 MMHG

## 2025-08-15 DIAGNOSIS — N95.2 POSTMENOPAUSAL ATROPHIC VAGINITIS: ICD-10-CM

## 2025-08-15 DIAGNOSIS — Z01.419 ENCOUNTER FOR GYNECOLOGICAL EXAMINATION (GENERAL) (ROUTINE) W/OUT ABNORMAL FINDINGS: ICD-10-CM

## 2025-08-15 DIAGNOSIS — M81.0 AGE-RELATED OSTEOPOROSIS W/OUT CURRENT PATHOLOGICAL FRACTURE: ICD-10-CM

## 2025-08-15 PROCEDURE — 99459 PELVIC EXAMINATION: CPT | Mod: NC

## 2025-08-15 PROCEDURE — 99396 PREV VISIT EST AGE 40-64: CPT

## 2025-08-19 LAB — HPV HIGH+LOW RISK DNA PNL CVX: NOT DETECTED

## 2025-08-21 LAB — CYTOLOGY CVX/VAG DOC THIN PREP: NORMAL
